# Patient Record
Sex: MALE | Race: WHITE | NOT HISPANIC OR LATINO | Employment: FULL TIME | URBAN - METROPOLITAN AREA
[De-identification: names, ages, dates, MRNs, and addresses within clinical notes are randomized per-mention and may not be internally consistent; named-entity substitution may affect disease eponyms.]

---

## 2019-08-13 ENCOUNTER — TELEPHONE (OUTPATIENT)
Dept: PLASTIC SURGERY | Facility: CLINIC | Age: 34
End: 2019-08-13

## 2019-08-13 NOTE — TELEPHONE ENCOUNTER
[facility] Call [facility]    Phone Message    May a detailed message be left on voicemail: yes    Reason for Call: Other: Pt requested an appointment with Dr Rosalino Garza via [facility]. Please call pt to discuss, thanks!     Action Taken: Message routed to:  [facility] ([facility]): Pita Marcelo

## 2019-08-15 NOTE — TELEPHONE ENCOUNTER
Called pt back, LVM to call back to complete intake and get scheduled.    matt gu  [facility]

## 2019-10-03 ENCOUNTER — PATIENT OUTREACH (OUTPATIENT)
Dept: PLASTIC SURGERY | Facility: CLINIC | Age: 34
End: 2019-10-03

## 2019-10-03 DIAGNOSIS — F64.0 GENDER DYSPHORIA IN ADOLESCENT AND ADULT: ICD-10-CM

## 2019-10-03 DIAGNOSIS — F64.0 GENDER DYSPHORIA IN ADULT: Primary | ICD-10-CM

## 2019-10-03 NOTE — PROGRESS NOTES
[facility]:  Care Coordination Note     SITUATION   Patient (Jesus, He/him) is a 34 year old who is receiving support for:  Consult For (top surgery) and Clinic Care Coordination - Initial  .    BACKGROUND     New Pt requesting top surgery consult with Dr. Garza. Pt has letter of support.     ASSESSMENT     Surgery              [facility] Assessment  [facility] [place] Enrollment: Enrolled(Not on Hormones, planning on started in next month or two)  Patient has a therapist: Yes  Name of therapist: Kayleigh Jalloh  Letter of support #1: Requested  Surgery being considered: Yes  Mastectomy: Yes          PLAN          Nursing Interventions:   [facility] [place] program and services discussed with patient. [facility] [place] referral placed. [facility] [place] assessment completed. Process for accessing surgery discussed including: [facility] standards of care, Letters of support, [place] insurance process, surgery scheduling, and approximate timeline. Pt questions answered. Registration completed & reviewed; scheduling process discussed & completed, as necessary.    More than 50% of the time was used to educate patient on medical & surgical process.     Follow-up plan:  Pt to attend consult and bring letter of support. Pt scheduled on 8/18/20 at 12:30pm.       Alfred Marcelo

## 2019-10-05 ENCOUNTER — HEALTH MAINTENANCE LETTER (OUTPATIENT)
Age: 34
End: 2019-10-05

## 2020-07-29 NOTE — TELEPHONE ENCOUNTER
FUTURE VISIT INFORMATION      FUTURE VISIT INFORMATION:    Date: 8/18/20    Time: 12:30pm    Location: [facility]  REFERRAL INFORMATION:    Referring provider:  self    Referring providers clinic:  N/A    Reason for visit/diagnosis  Top consult    RECORDS REQUESTED FROM:       No recs to collect

## 2020-08-17 ENCOUNTER — PATIENT OUTREACH (OUTPATIENT)
Dept: PLASTIC SURGERY | Facility: CLINIC | Age: 35
End: 2020-08-17

## 2020-08-18 ENCOUNTER — PRE VISIT (OUTPATIENT)
Dept: PLASTIC SURGERY | Facility: CLINIC | Age: 35
End: 2020-08-18

## 2020-08-20 NOTE — PROGRESS NOTES
Pt left VM to cancel appointment less then 24 hrs and was not obtained prior to consult.     Alfred Marcelo, [facility]  Transgender Care Coordinator

## 2020-11-14 ENCOUNTER — HEALTH MAINTENANCE LETTER (OUTPATIENT)
Age: 35
End: 2020-11-14

## 2021-09-12 ENCOUNTER — HEALTH MAINTENANCE LETTER (OUTPATIENT)
Age: 36
End: 2021-09-12

## 2022-01-02 ENCOUNTER — HEALTH MAINTENANCE LETTER (OUTPATIENT)
Age: 37
End: 2022-01-02

## 2022-11-19 ENCOUNTER — HEALTH MAINTENANCE LETTER (OUTPATIENT)
Age: 37
End: 2022-11-19

## 2023-04-09 ENCOUNTER — HEALTH MAINTENANCE LETTER (OUTPATIENT)
Age: 38
End: 2023-04-09